# Patient Record
(demographics unavailable — no encounter records)

---

## 2024-10-14 NOTE — HISTORY OF PRESENT ILLNESS
[FreeTextEntry1] : This is a 70 year old woman with obesity, diabetes, hypertension, hypothyroid who presents to the office for a cardiac evaluation.  She reports that for months she has been tripping.  She gets very dizzy and lightheaded momentarily, and falls.  She hit her face on one occasion.  She is also short of breath with exertion.  She denies chest pain, PND, orthopnea, or true syncope.  She reports that she had coronary angiography in 2017 prior to surgery and was told that it was normal.  She does not exercise formally.

## 2024-10-14 NOTE — DISCUSSION/SUMMARY
[FreeTextEntry1] : This is a 70 year old woman with obesity, diabetes, hypertension, hypothyroid who presents to the office for a cardiac evaluation.  She reports that for months she has been tripping.  She gets very dizzy and lightheaded momentarily, and falls.  She hit her face on one occasion.  She is also short of breath with exertion.   I am referring her  for an echocardiogram to assess Her cardiac structure and function, as well as a pharmacologic stress test to assess for the presence of obstructive coronary artery disease.   I am placing a two week Zio and sending her for a carotid Doppler.  She reports pressure is controlled when she checks at outside office visits.  I will call with the results, and to schedule any necessary follow up.  [EKG obtained to assist in diagnosis and management of assessed problem(s)] : EKG obtained to assist in diagnosis and management of assessed problem(s)